# Patient Record
Sex: MALE | Race: WHITE | NOT HISPANIC OR LATINO | URBAN - METROPOLITAN AREA
[De-identification: names, ages, dates, MRNs, and addresses within clinical notes are randomized per-mention and may not be internally consistent; named-entity substitution may affect disease eponyms.]

---

## 2020-07-25 ENCOUNTER — TELEPHONE ENCOUNTER (OUTPATIENT)
Dept: URBAN - METROPOLITAN AREA CLINIC 13 | Facility: CLINIC | Age: 50
End: 2020-07-25

## 2020-07-25 RX ORDER — OMEPRAZOLE 40 MG/1
TAKE 1 TABLET DAILY CAPSULE, DELAYED RELEASE ORAL
Qty: 30 | Refills: 5 | OUTPATIENT
Start: 2012-03-14 | End: 2012-03-23

## 2020-07-25 RX ORDER — PANTOPRAZOLE SODIUM 40 MG
TAKE 1 TABLET BY MOUTH TWICE DAILY TABLET, DELAYED RELEASE (ENTERIC COATED) ORAL
Qty: 60 | Refills: 7 | OUTPATIENT
End: 2015-09-22

## 2020-07-25 RX ORDER — ESOMEPRAZOLE MAGNESIUM 40 MG
TAKE 1 CAPSULE DAILY CAPSULE,DELAYED RELEASE (ENTERIC COATED) ORAL
Qty: 30 | Refills: 6 | OUTPATIENT
End: 2012-03-23

## 2020-07-26 ENCOUNTER — TELEPHONE ENCOUNTER (OUTPATIENT)
Dept: URBAN - METROPOLITAN AREA CLINIC 13 | Facility: CLINIC | Age: 50
End: 2020-07-26

## 2020-07-26 RX ORDER — AZITHROMYCIN DIHYDRATE 250 MG/1
TABLET, FILM COATED ORAL
Qty: 6 | Refills: 0 | Status: ACTIVE | COMMUNITY
Start: 2012-01-13

## 2020-07-26 RX ORDER — PROMETHAZINE HYDROCHLORIDE 25 MG/1
TABLET ORAL
Qty: 20 | Refills: 0 | Status: ACTIVE | COMMUNITY
Start: 2015-03-12

## 2020-07-26 RX ORDER — PANTOPRAZOLE SODIUM 40 MG/1
TABLET, DELAYED RELEASE ORAL
Qty: 30 | Refills: 0 | Status: ACTIVE | COMMUNITY
Start: 2015-06-29

## 2020-07-26 RX ORDER — AMLODIPINE BESYLATE 2.5 MG/1
TABLET ORAL
Qty: 30 | Refills: 0 | Status: ACTIVE | COMMUNITY
Start: 2015-06-18

## 2020-07-26 RX ORDER — DEXLANSOPRAZOLE 60 MG/1
TAKE 1 CAPSULE DAILY EVERY MORNING BEFORE BREAKFAST CAPSULE, DELAYED RELEASE ORAL
Qty: 30 | Refills: 6 | Status: ACTIVE | COMMUNITY
Start: 2015-08-20

## 2020-07-26 RX ORDER — DICYCLOMINE HYDROCHLORIDE 10 MG/1
CAPSULE ORAL
Qty: 40 | Refills: 0 | Status: ACTIVE | COMMUNITY
Start: 2015-03-12

## 2020-09-30 ENCOUNTER — TELEPHONE ENCOUNTER (OUTPATIENT)
Dept: URBAN - METROPOLITAN AREA CLINIC 113 | Facility: CLINIC | Age: 50
End: 2020-09-30

## 2022-02-09 ENCOUNTER — LAB OUTSIDE AN ENCOUNTER (OUTPATIENT)
Dept: URBAN - METROPOLITAN AREA CLINIC 107 | Facility: CLINIC | Age: 52
End: 2022-02-09

## 2022-02-09 ENCOUNTER — OFFICE VISIT (OUTPATIENT)
Dept: URBAN - METROPOLITAN AREA CLINIC 107 | Facility: CLINIC | Age: 52
End: 2022-02-09
Payer: COMMERCIAL

## 2022-02-09 ENCOUNTER — WEB ENCOUNTER (OUTPATIENT)
Dept: URBAN - METROPOLITAN AREA CLINIC 107 | Facility: CLINIC | Age: 52
End: 2022-02-09

## 2022-02-09 VITALS
DIASTOLIC BLOOD PRESSURE: 87 MMHG | SYSTOLIC BLOOD PRESSURE: 147 MMHG | TEMPERATURE: 97 F | RESPIRATION RATE: 18 BRPM | HEART RATE: 75 BPM | WEIGHT: 195 LBS | BODY MASS INDEX: 25.03 KG/M2 | HEIGHT: 74 IN

## 2022-02-09 DIAGNOSIS — Z12.11 COLON CANCER SCREENING: ICD-10-CM

## 2022-02-09 DIAGNOSIS — K21.9 GASTROESOPHAGEAL REFLUX DISEASE WITHOUT ESOPHAGITIS: ICD-10-CM

## 2022-02-09 DIAGNOSIS — R13.19 ESOPHAGEAL DYSPHAGIA: ICD-10-CM

## 2022-02-09 PROCEDURE — 99204 OFFICE O/P NEW MOD 45 MIN: CPT | Performed by: INTERNAL MEDICINE

## 2022-02-09 RX ORDER — AMLODIPINE BESYLATE 2.5 MG/1
TABLET ORAL
Qty: 30 | Refills: 0 | Status: DISCONTINUED | COMMUNITY
Start: 2015-06-18

## 2022-02-09 RX ORDER — AZITHROMYCIN DIHYDRATE 250 MG/1
TABLET, FILM COATED ORAL
Qty: 6 | Refills: 0 | Status: DISCONTINUED | COMMUNITY
Start: 2012-01-13

## 2022-02-09 RX ORDER — PROMETHAZINE HYDROCHLORIDE 25 MG/1
TABLET ORAL
Qty: 20 | Refills: 0 | Status: DISCONTINUED | COMMUNITY
Start: 2015-03-12

## 2022-02-09 RX ORDER — PANTOPRAZOLE SODIUM 40 MG/1
TABLET, DELAYED RELEASE ORAL
Qty: 30 | Refills: 0 | Status: DISCONTINUED | COMMUNITY
Start: 2015-06-29

## 2022-02-09 RX ORDER — DICYCLOMINE HYDROCHLORIDE 10 MG/1
CAPSULE ORAL
Qty: 40 | Refills: 0 | Status: DISCONTINUED | COMMUNITY
Start: 2015-03-12

## 2022-02-09 RX ORDER — DEXLANSOPRAZOLE 60 MG/1
TAKE 1 CAPSULE DAILY EVERY MORNING BEFORE BREAKFAST CAPSULE, DELAYED RELEASE ORAL
Qty: 30 | Refills: 6 | Status: DISCONTINUED | COMMUNITY
Start: 2015-08-20

## 2022-02-09 NOTE — HPI-TODAY'S VISIT:
The patient is a very pleasant 51-year-old gentleman who I initially started seeing in October 2010.  That time I saw him for acid reflux with dysphagia.  He was last seen in the office in September 2015.  Had seen him for atypical chest pain at that time.  He has had a Nissen fundoplication for his acid reflux.  Last EGD was March 2012.  This revealed Nissen fundoplication otherwise unremarkable.  EGD in 2010 for dysphagia prior to his surgery revealed a medium size hiatal hernia.  He had manometry testing in 2011 which was unremarkable. Review of referring records reveals a left inguinal hernia repair last month on January 21.  Laboratory testing from December 2021 revealed a white cell count of 4 and hemoglobin of 14.6.  Platelet count of 165.  CMP revealed a normal BMP and normal LFTs. The patient states that he initially set up this appointment when he developed severe abdominal pain. This though turned out to be a left inguinal hernia which he had repaired as noted above. He states the abdominal pain has resolved. He kept the appointment for 2 reasons. The first is that he has developed a postprandial fullness in the chest that he attributes to acid reflux since the left inguinal hernia surgery. Interestingly after his acoustic neuroma surgery had similar symptomatology. He also has a second reason for coming as he has not had a colonoscopy for screening purposes and wishes to proceed with this.

## 2022-02-11 ENCOUNTER — TELEPHONE ENCOUNTER (OUTPATIENT)
Dept: URBAN - METROPOLITAN AREA CLINIC 113 | Facility: CLINIC | Age: 52
End: 2022-02-11

## 2022-02-11 RX ORDER — SODIUM, POTASSIUM,MAG SULFATES 17.5-3.13G
354ML SOLUTION, RECONSTITUTED, ORAL ORAL
Qty: 354 MILLILITER | Refills: 0 | OUTPATIENT
Start: 2022-02-11 | End: 2022-02-12

## 2022-02-24 ENCOUNTER — TELEPHONE ENCOUNTER (OUTPATIENT)
Dept: URBAN - METROPOLITAN AREA CLINIC 107 | Facility: CLINIC | Age: 52
End: 2022-02-24

## 2022-03-04 ENCOUNTER — TELEPHONE ENCOUNTER (OUTPATIENT)
Dept: URBAN - METROPOLITAN AREA CLINIC 107 | Facility: CLINIC | Age: 52
End: 2022-03-04

## 2022-03-10 ENCOUNTER — LAB OUTSIDE AN ENCOUNTER (OUTPATIENT)
Dept: URBAN - METROPOLITAN AREA CLINIC 107 | Facility: CLINIC | Age: 52
End: 2022-03-10

## 2022-03-10 ENCOUNTER — OFFICE VISIT (OUTPATIENT)
Dept: URBAN - METROPOLITAN AREA CLINIC 107 | Facility: CLINIC | Age: 52
End: 2022-03-10
Payer: COMMERCIAL

## 2022-03-10 ENCOUNTER — TELEPHONE ENCOUNTER (OUTPATIENT)
Dept: URBAN - METROPOLITAN AREA CLINIC 113 | Facility: CLINIC | Age: 52
End: 2022-03-10

## 2022-03-10 VITALS
WEIGHT: 191 LBS | BODY MASS INDEX: 24.51 KG/M2 | DIASTOLIC BLOOD PRESSURE: 96 MMHG | SYSTOLIC BLOOD PRESSURE: 155 MMHG | HEART RATE: 61 BPM | RESPIRATION RATE: 18 BRPM | HEIGHT: 74 IN | TEMPERATURE: 97.5 F

## 2022-03-10 DIAGNOSIS — R13.19 ESOPHAGEAL DYSPHAGIA: ICD-10-CM

## 2022-03-10 DIAGNOSIS — K21.9 GASTROESOPHAGEAL REFLUX DISEASE WITHOUT ESOPHAGITIS: ICD-10-CM

## 2022-03-10 DIAGNOSIS — Z12.11 COLON CANCER SCREENING: ICD-10-CM

## 2022-03-10 PROCEDURE — 99214 OFFICE O/P EST MOD 30 MIN: CPT | Performed by: INTERNAL MEDICINE

## 2022-03-10 RX ORDER — OMEPRAZOLE MAGNESIUM 10 MG/1
AS DIRECTED GRANULE, DELAYED RELEASE ORAL
Status: ACTIVE | COMMUNITY

## 2022-03-10 NOTE — HPI-TODAY'S VISIT:
The patient is a very pleasant 51-year-old gentleman who I initially started seeing in October 2010.  That time I saw him for acid reflux with dysphagia.  He was last seen in the office in September 2015.  Had seen him for atypical chest pain at that time.  He has had a Nissen fundoplication for his acid reflux.  Last EGD was March 2012.  This revealed Nissen fundoplication otherwise unremarkable.  EGD in 2010 for dysphagia prior to his surgery revealed a medium size hiatal hernia.  He had manometry testing in 2011 which was unremarkable. Review of referring records reveals a left inguinal hernia repair last month on January 21.  Laboratory testing from December 2021 revealed a white cell count of 4 and hemoglobin of 14.6.  Platelet count of 165.  CMP revealed a normal BMP and normal LFTs. The patient states that he initially set up this appointment when he developed severe abdominal pain. This though turned out to be a left inguinal hernia which he had repaired as noted above. He states the abdominal pain has resolved. He kept the appointment for 2 reasons. The first is that he has developed a postprandial fullness in the chest that he attributes to acid reflux since the left inguinal hernia surgery. Interestingly after his acoustic neuroma surgery had similar symptomatology. He also has a second reason for coming as he has not had a colonoscopy for screening purposes and wishes to proceed with this. Interval history.  Upper GIs series performed March 1 revealed Nissen fundoplication otherwise unremarkable study. The patient states he continues to have throat clearing problems.  He states proton pump inhibitors are not helping this.  He feels this is acid reflux.  Though he does state if he comes off proton pump inhibitors he developed severe heartburn.  He states this is a new problem over the past few months.  He has not had this problem since he had surgery 15 years ago.  He is very troubled by this.

## 2022-03-30 ENCOUNTER — CLAIMS CREATED FROM THE CLAIM WINDOW (OUTPATIENT)
Dept: URBAN - METROPOLITAN AREA CLINIC 4 | Facility: CLINIC | Age: 52
End: 2022-03-30
Payer: COMMERCIAL

## 2022-03-30 ENCOUNTER — WEB ENCOUNTER (OUTPATIENT)
Dept: URBAN - METROPOLITAN AREA SURGERY CENTER 25 | Facility: SURGERY CENTER | Age: 52
End: 2022-03-30

## 2022-03-30 ENCOUNTER — OFFICE VISIT (OUTPATIENT)
Dept: URBAN - METROPOLITAN AREA SURGERY CENTER 25 | Facility: SURGERY CENTER | Age: 52
End: 2022-03-30
Payer: COMMERCIAL

## 2022-03-30 DIAGNOSIS — K29.81 DUODENITIS WITH BLEEDING: ICD-10-CM

## 2022-03-30 DIAGNOSIS — K21.9 GASTROESOPHAGEAL REFLUX DISEASE: ICD-10-CM

## 2022-03-30 DIAGNOSIS — K31.89 FOCAL FOVEOLAR HYPERPLASIA: ICD-10-CM

## 2022-03-30 DIAGNOSIS — K29.80 PEPTIC DUODENITIS: ICD-10-CM

## 2022-03-30 DIAGNOSIS — K22.89 OTHER SPECIFIED DISEASE OF ESOPHAGUS: ICD-10-CM

## 2022-03-30 PROCEDURE — 43239 EGD BIOPSY SINGLE/MULTIPLE: CPT | Performed by: INTERNAL MEDICINE

## 2022-03-30 PROCEDURE — G8907 PT DOC NO EVENTS ON DISCHARG: HCPCS | Performed by: INTERNAL MEDICINE

## 2022-03-30 PROCEDURE — 88305 TISSUE EXAM BY PATHOLOGIST: CPT | Performed by: PATHOLOGY

## 2022-03-30 PROCEDURE — 88312 SPECIAL STAINS GROUP 1: CPT | Performed by: PATHOLOGY

## 2022-03-30 RX ORDER — OMEPRAZOLE MAGNESIUM 10 MG/1
AS DIRECTED GRANULE, DELAYED RELEASE ORAL
Status: ACTIVE | COMMUNITY

## 2022-04-18 ENCOUNTER — OFFICE VISIT (OUTPATIENT)
Dept: URBAN - METROPOLITAN AREA SURGERY CENTER 25 | Facility: SURGERY CENTER | Age: 52
End: 2022-04-18

## 2022-04-22 ENCOUNTER — OFFICE VISIT (OUTPATIENT)
Dept: URBAN - METROPOLITAN AREA CLINIC 107 | Facility: CLINIC | Age: 52
End: 2022-04-22

## 2022-06-01 ENCOUNTER — OFFICE VISIT (OUTPATIENT)
Dept: URBAN - METROPOLITAN AREA SURGERY CENTER 25 | Facility: SURGERY CENTER | Age: 52
End: 2022-06-01

## 2022-06-21 ENCOUNTER — OFFICE VISIT (OUTPATIENT)
Dept: URBAN - METROPOLITAN AREA CLINIC 107 | Facility: CLINIC | Age: 52
End: 2022-06-21

## 2022-06-27 ENCOUNTER — WEB ENCOUNTER (OUTPATIENT)
Dept: URBAN - METROPOLITAN AREA SURGERY CENTER 25 | Facility: SURGERY CENTER | Age: 52
End: 2022-06-27

## 2022-06-29 ENCOUNTER — TELEPHONE ENCOUNTER (OUTPATIENT)
Dept: URBAN - METROPOLITAN AREA CLINIC 113 | Facility: CLINIC | Age: 52
End: 2022-06-29

## 2022-06-29 ENCOUNTER — OFFICE VISIT (OUTPATIENT)
Dept: URBAN - METROPOLITAN AREA SURGERY CENTER 25 | Facility: SURGERY CENTER | Age: 52
End: 2022-06-29
Payer: COMMERCIAL

## 2022-06-29 ENCOUNTER — CLAIMS CREATED FROM THE CLAIM WINDOW (OUTPATIENT)
Dept: URBAN - METROPOLITAN AREA CLINIC 4 | Facility: CLINIC | Age: 52
End: 2022-06-29
Payer: COMMERCIAL

## 2022-06-29 DIAGNOSIS — Z12.11 COLON CANCER SCREENING: ICD-10-CM

## 2022-06-29 DIAGNOSIS — K62.1 HYPERPLASTIC RECTAL POLYP: ICD-10-CM

## 2022-06-29 DIAGNOSIS — K63.89 OTHER SPECIFIED DISEASES OF INTESTINE: ICD-10-CM

## 2022-06-29 PROCEDURE — G8907 PT DOC NO EVENTS ON DISCHARG: HCPCS | Performed by: INTERNAL MEDICINE

## 2022-06-29 PROCEDURE — 45385 COLONOSCOPY W/LESION REMOVAL: CPT | Performed by: INTERNAL MEDICINE

## 2022-06-29 PROCEDURE — 88305 TISSUE EXAM BY PATHOLOGIST: CPT | Performed by: PATHOLOGY

## 2022-06-29 RX ORDER — OMEPRAZOLE MAGNESIUM 10 MG/1
AS DIRECTED GRANULE, DELAYED RELEASE ORAL
Status: ACTIVE | COMMUNITY

## 2022-07-08 PROBLEM — 275978004 COLON CANCER SCREENING: Status: ACTIVE | Noted: 2022-06-01

## 2022-07-21 ENCOUNTER — OFFICE VISIT (OUTPATIENT)
Dept: URBAN - METROPOLITAN AREA CLINIC 107 | Facility: CLINIC | Age: 52
End: 2022-07-21

## 2023-02-28 ENCOUNTER — WEB ENCOUNTER (OUTPATIENT)
Dept: URBAN - METROPOLITAN AREA CLINIC 113 | Facility: CLINIC | Age: 53
End: 2023-02-28

## 2023-03-06 ENCOUNTER — OFFICE VISIT (OUTPATIENT)
Dept: URBAN - METROPOLITAN AREA CLINIC 113 | Facility: CLINIC | Age: 53
End: 2023-03-06
Payer: COMMERCIAL

## 2023-03-06 VITALS
HEART RATE: 75 BPM | BODY MASS INDEX: 26.31 KG/M2 | RESPIRATION RATE: 16 BRPM | SYSTOLIC BLOOD PRESSURE: 158 MMHG | DIASTOLIC BLOOD PRESSURE: 95 MMHG | TEMPERATURE: 97.7 F | HEIGHT: 74 IN | WEIGHT: 205 LBS

## 2023-03-06 DIAGNOSIS — K57.30 DIVERTICULOSIS LARGE INTESTINE W/O PERFORATION OR ABSCESS W/O BLEEDING: ICD-10-CM

## 2023-03-06 DIAGNOSIS — R13.19 ESOPHAGEAL DYSPHAGIA: ICD-10-CM

## 2023-03-06 DIAGNOSIS — K21.9 GASTROESOPHAGEAL REFLUX DISEASE WITHOUT ESOPHAGITIS: ICD-10-CM

## 2023-03-06 PROBLEM — 266435005: Status: ACTIVE | Noted: 2022-02-09

## 2023-03-06 PROBLEM — 724538004: Status: ACTIVE | Noted: 2023-03-06

## 2023-03-06 PROCEDURE — 99214 OFFICE O/P EST MOD 30 MIN: CPT | Performed by: INTERNAL MEDICINE

## 2023-03-06 RX ORDER — OMEPRAZOLE MAGNESIUM 10 MG/1
AS DIRECTED GRANULE, DELAYED RELEASE ORAL
Status: ACTIVE | COMMUNITY

## 2023-03-06 RX ORDER — OMEPRAZOLE 20 MG/1
1 CAPSULE 30 MINUTES BEFORE MORNING MEAL CAPSULE, DELAYED RELEASE ORAL TWICE A DAY
Qty: 180 | Refills: 3 | OUTPATIENT

## 2023-03-06 NOTE — HPI-TODAY'S VISIT:
The patient is a very pleasant 51-year-old gentleman who I initially started seeing in October 2010.  That time I saw him for acid reflux with dysphagia.  He was last seen in the office in September 2015.  Had seen him for atypical chest pain at that time.  He has had a Nissen fundoplication for his acid reflux.  Last EGD was March 2012.  This revealed Nissen fundoplication otherwise unremarkable.  EGD in 2010 for dysphagia prior to his surgery revealed a medium size hiatal hernia.  He had manometry testing in 2011 which was unremarkable. Review of referring records reveals a left inguinal hernia repair last month on January 21.  Laboratory testing from December 2021 revealed a white cell count of 4 and hemoglobin of 14.6.  Platelet count of 165.  CMP revealed a normal BMP and normal LFTs. The patient states that he initially set up this appointment when he developed severe abdominal pain. This though turned out to be a left inguinal hernia which he had repaired as noted above. He states the abdominal pain has resolved. He kept the appointment for 2 reasons. The first is that he has developed a postprandial fullness in the chest that he attributes to acid reflux since the left inguinal hernia surgery. Interestingly after his acoustic neuroma surgery had similar symptomatology. He also has a second reason for coming as he has not had a colonoscopy for screening purposes and wishes to proceed with this. Interval history.  Upper GIs series performed March 1 revealed Nissen fundoplication otherwise unremarkable study. The patient states he continues to have throat clearing problems.  He states proton pump inhibitors are not helping this.  He feels this is acid reflux.  Though he does state if he comes off proton pump inhibitors he developed severe heartburn.  He states this is a new problem over the past few months.  He has not had this problem since he had surgery 15 years ago.  He is very troubled by this. Interval history, 3/6/2023.  Colonoscopy performed June 29, 2022 for screening purposes revealed a 6 mm rectal polyp along with diverticulosis.  The polyp returned as a hyperplastic polyp so he was recommended a 10-year follow-up.  Upper endoscopy performed March 30, 2022 for reflux revealed an irregular Z-line and a 10 mm healed ulcer in the prepyloric region.  Biopsies of the stomach for H. pylori were negative. The patient states that omeprazole 20 mg works very well for his symptoms he sometimes has nighttime breakthrough.  He states if he does not take it he gets a dull chest pressure along with voice changes and burning in his neck.  On the medication the symptoms all resolved.  He has numerous questions in regards to surgical management.

## 2023-03-06 NOTE — EXAM-PHYSICAL EXAM
He is alert and oriented to person place and situation no acute distress.  There is no scleral icterus.  Impression 1.  Acid reflux.  Did very well for 15 years after Nissen fundoplication.

## 2023-03-08 ENCOUNTER — TELEPHONE ENCOUNTER (OUTPATIENT)
Dept: URBAN - METROPOLITAN AREA CLINIC 113 | Facility: CLINIC | Age: 53
End: 2023-03-08

## 2023-03-08 RX ORDER — OMEPRAZOLE 20 MG/1
1 CAPSULE 30 MINUTES BEFORE MORNING MEAL CAPSULE, DELAYED RELEASE ORAL TWICE A DAY
Qty: 180 | Refills: 3 | Status: ACTIVE | COMMUNITY

## 2023-03-08 RX ORDER — OMEPRAZOLE MAGNESIUM 10 MG/1
AS DIRECTED GRANULE, DELAYED RELEASE ORAL
Status: ACTIVE | COMMUNITY

## 2023-03-08 RX ORDER — OMEPRAZOLE 40 MG/1
1 CAPSULE CAPSULE, DELAYED RELEASE ORAL TWICE A DAY
Qty: 60 | Refills: 3 | OUTPATIENT
Start: 2023-03-09

## 2023-07-24 ENCOUNTER — TELEPHONE ENCOUNTER (OUTPATIENT)
Dept: URBAN - METROPOLITAN AREA CLINIC 113 | Facility: CLINIC | Age: 53
End: 2023-07-24

## 2023-08-21 ENCOUNTER — LAB OUTSIDE AN ENCOUNTER (OUTPATIENT)
Dept: URBAN - METROPOLITAN AREA CLINIC 113 | Facility: CLINIC | Age: 53
End: 2023-08-21

## 2023-08-21 ENCOUNTER — TELEPHONE ENCOUNTER (OUTPATIENT)
Dept: URBAN - METROPOLITAN AREA CLINIC 113 | Facility: CLINIC | Age: 53
End: 2023-08-21

## 2023-09-20 ENCOUNTER — OFFICE VISIT (OUTPATIENT)
Dept: URBAN - METROPOLITAN AREA SURGERY CENTER 25 | Facility: SURGERY CENTER | Age: 53
End: 2023-09-20

## 2023-09-21 ENCOUNTER — TELEPHONE ENCOUNTER (OUTPATIENT)
Dept: URBAN - METROPOLITAN AREA CLINIC 113 | Facility: CLINIC | Age: 53
End: 2023-09-21

## 2023-12-12 ENCOUNTER — DASHBOARD ENCOUNTERS (OUTPATIENT)
Age: 53
End: 2023-12-12

## 2023-12-12 ENCOUNTER — OFFICE VISIT (OUTPATIENT)
Dept: URBAN - METROPOLITAN AREA CLINIC 107 | Facility: CLINIC | Age: 53
End: 2023-12-12
Payer: COMMERCIAL

## 2023-12-12 VITALS
DIASTOLIC BLOOD PRESSURE: 76 MMHG | SYSTOLIC BLOOD PRESSURE: 121 MMHG | TEMPERATURE: 97.7 F | BODY MASS INDEX: 26.05 KG/M2 | HEIGHT: 74 IN | HEART RATE: 76 BPM | WEIGHT: 203 LBS

## 2023-12-12 DIAGNOSIS — K52.9 CHRONIC DIARRHEA: ICD-10-CM

## 2023-12-12 DIAGNOSIS — K21.9 GASTROESOPHAGEAL REFLUX DISEASE WITHOUT ESOPHAGITIS: ICD-10-CM

## 2023-12-12 DIAGNOSIS — R13.19 ESOPHAGEAL DYSPHAGIA: ICD-10-CM

## 2023-12-12 DIAGNOSIS — K57.30 DIVERTICULOSIS LARGE INTESTINE W/O PERFORATION OR ABSCESS W/O BLEEDING: ICD-10-CM

## 2023-12-12 PROCEDURE — 99214 OFFICE O/P EST MOD 30 MIN: CPT | Performed by: INTERNAL MEDICINE

## 2023-12-12 RX ORDER — OMEPRAZOLE MAGNESIUM 10 MG/1
AS DIRECTED GRANULE, DELAYED RELEASE ORAL
Status: ON HOLD | COMMUNITY

## 2023-12-12 RX ORDER — OMEPRAZOLE 20 MG/1
1 CAPSULE 30 MINUTES BEFORE MORNING MEAL CAPSULE, DELAYED RELEASE ORAL TWICE A DAY
Qty: 180 | Refills: 3 | Status: ON HOLD | COMMUNITY

## 2023-12-12 RX ORDER — OMEPRAZOLE 40 MG/1
1 CAPSULE CAPSULE, DELAYED RELEASE ORAL TWICE A DAY
Qty: 60 | Refills: 3 | Status: ON HOLD | COMMUNITY
Start: 2023-03-09

## 2023-12-12 NOTE — HPI-TODAY'S VISIT:
The patient is a very pleasant 51-year-old gentleman who I initially started seeing in October 2010.  That time I saw him for acid reflux with dysphagia.  He was last seen in the office in September 2015.  Had seen him for atypical chest pain at that time.  He has had a Nissen fundoplication for his acid reflux.  Last EGD was March 2012.  This revealed Nissen fundoplication otherwise unremarkable.  EGD in 2010 for dysphagia prior to his surgery revealed a medium size hiatal hernia.  He had manometry testing in 2011 which was unremarkable. Review of referring records reveals a left inguinal hernia repair last month on January 21.  Laboratory testing from December 2021 revealed a white cell count of 4 and hemoglobin of 14.6.  Platelet count of 165.  CMP revealed a normal BMP and normal LFTs. The patient states that he initially set up this appointment when he developed severe abdominal pain. This though turned out to be a left inguinal hernia which he had repaired as noted above. He states the abdominal pain has resolved. He kept the appointment for 2 reasons. The first is that he has developed a postprandial fullness in the chest that he attributes to acid reflux since the left inguinal hernia surgery. Interestingly after his acoustic neuroma surgery had similar symptomatology. He also has a second reason for coming as he has not had a colonoscopy for screening purposes and wishes to proceed with this. Interval history.  Upper GIs series performed March 1 revealed Nissen fundoplication otherwise unremarkable study. The patient states he continues to have throat clearing problems.  He states proton pump inhibitors are not helping this.  He feels this is acid reflux.  Though he does state if he comes off proton pump inhibitors he developed severe heartburn.  He states this is a new problem over the past few months.  He has not had this problem since he had surgery 15 years ago.  He is very troubled by this. Interval history, 3/6/2023.  Colonoscopy performed June 29, 2022 for screening purposes revealed a 6 mm rectal polyp along with diverticulosis.  The polyp returned as a hyperplastic polyp so he was recommended a 10-year follow-up.  Upper endoscopy performed March 30, 2022 for reflux revealed an irregular Z-line and a 10 mm healed ulcer in the prepyloric region.  Biopsies of the stomach for H. pylori were negative. The patient states that omeprazole 20 mg works very well for his symptoms he sometimes has nighttime breakthrough.  He states if he does not take it he gets a dull chest pressure along with voice changes and burning in his neck.  On the medication the symptoms all resolved.  He has numerous questions in regards to surgical management. Interval history, 12/12/2023.  The patient states that he discussed his case with the surgical team.  He was scheduled to have EGD with Bravo probe but decided against having surgery for acid reflux so canceled that procedure.  He states he discontinued his omeprazole and his heartburn has not returned.  He has no dysphagia.  He actually has a new complaint of loose stools that have been present since September.  Of the last 2 days though his bowel movements have returned to normal.  He does have a fair amount of foreign travel especially to [place] related to his new job as an [employer] .  There is no blood in his stool.  He does not take any over-the-counter supplements.  He is not on any medications.  There is no weight loss.

## 2023-12-28 ENCOUNTER — TELEPHONE ENCOUNTER (OUTPATIENT)
Dept: URBAN - METROPOLITAN AREA CLINIC 113 | Facility: CLINIC | Age: 53
End: 2023-12-28

## 2024-01-04 ENCOUNTER — WEB ENCOUNTER (OUTPATIENT)
Dept: URBAN - METROPOLITAN AREA CLINIC 107 | Facility: CLINIC | Age: 54
End: 2024-01-04